# Patient Record
Sex: MALE | Race: WHITE | Employment: FULL TIME | URBAN - NONMETROPOLITAN AREA
[De-identification: names, ages, dates, MRNs, and addresses within clinical notes are randomized per-mention and may not be internally consistent; named-entity substitution may affect disease eponyms.]

---

## 2022-02-22 ENCOUNTER — HOSPITAL ENCOUNTER (EMERGENCY)
Age: 62
Discharge: HOME | End: 2022-02-22
Attending: EMERGENCY MEDICINE
Payer: MEDICAID

## 2022-02-22 VITALS
SYSTOLIC BLOOD PRESSURE: 122 MMHG | TEMPERATURE: 98.2 F | DIASTOLIC BLOOD PRESSURE: 79 MMHG | OXYGEN SATURATION: 94 % | RESPIRATION RATE: 17 BRPM | WEIGHT: 130 LBS | HEART RATE: 92 BPM

## 2022-02-22 DIAGNOSIS — R73.9 HYPERGLYCEMIA: ICD-10-CM

## 2022-02-22 DIAGNOSIS — T14.8XXA OPEN WOUND: ICD-10-CM

## 2022-02-22 DIAGNOSIS — G62.9 NEUROPATHY: Primary | ICD-10-CM

## 2022-02-22 LAB
ALBUMIN SERPL-MCNC: 3.6 G/DL (ref 3.5–5.2)
ALP BLD-CCNC: 204 U/L (ref 40–130)
ALT SERPL-CCNC: 38 U/L (ref 5–41)
AMPHETAMINE SCREEN, URINE: POSITIVE
ANION GAP SERPL CALCULATED.3IONS-SCNC: 15 MMOL/L (ref 7–19)
APTT: 29.9 SEC (ref 26–36.2)
AST SERPL-CCNC: 29 U/L (ref 5–40)
BARBITURATE SCREEN URINE: NEGATIVE
BASE EXCESS ARTERIAL: 1 MMOL/L (ref -2–2)
BASOPHILS ABSOLUTE: 0 K/UL (ref 0–0.2)
BASOPHILS RELATIVE PERCENT: 0.2 % (ref 0–1)
BENZODIAZEPINE SCREEN, URINE: NEGATIVE
BILIRUB SERPL-MCNC: 0.6 MG/DL (ref 0.2–1.2)
BUN BLDV-MCNC: 31 MG/DL (ref 8–23)
C-REACTIVE PROTEIN: 2.43 MG/DL (ref 0–0.5)
CALCIUM SERPL-MCNC: 8.9 MG/DL (ref 8.8–10.2)
CANNABINOID SCREEN URINE: POSITIVE
CARBOXYHEMOGLOBIN ARTERIAL: 4.2 % (ref 0–5)
CHLORIDE BLD-SCNC: 81 MMOL/L (ref 98–111)
CO2: 24 MMOL/L (ref 22–29)
COCAINE METABOLITE SCREEN URINE: NEGATIVE
CREAT SERPL-MCNC: 1.4 MG/DL (ref 0.5–1.2)
EOSINOPHILS ABSOLUTE: 0.1 K/UL (ref 0–0.6)
EOSINOPHILS RELATIVE PERCENT: 0.6 % (ref 0–5)
ETHANOL: <10 MG/DL (ref 0–0.08)
GFR AFRICAN AMERICAN: >59
GFR NON-AFRICAN AMERICAN: 51
GLUCOSE BLD-MCNC: 417 MG/DL (ref 70–99)
GLUCOSE BLD-MCNC: 431 MG/DL (ref 74–109)
GLUCOSE BLD-MCNC: 823 MG/DL (ref 74–109)
HCO3 ARTERIAL: 26.6 MMOL/L (ref 22–26)
HCT VFR BLD CALC: 43.2 % (ref 42–52)
HEMOGLOBIN, ART, EXTENDED: 13.1 G/DL (ref 14–18)
HEMOGLOBIN: 14.1 G/DL (ref 14–18)
IMMATURE GRANULOCYTES #: 0.1 K/UL
INR BLD: 0.97 (ref 0.88–1.18)
LACTIC ACID: 3 MMOL/L (ref 0.5–1.9)
LYMPHOCYTES ABSOLUTE: 2.2 K/UL (ref 1.1–4.5)
LYMPHOCYTES RELATIVE PERCENT: 22.7 % (ref 20–40)
Lab: ABNORMAL
MCH RBC QN AUTO: 27.4 PG (ref 27–31)
MCHC RBC AUTO-ENTMCNC: 32.6 G/DL (ref 33–37)
MCV RBC AUTO: 84 FL (ref 80–94)
METHEMOGLOBIN ARTERIAL: 1 %
MONOCYTES ABSOLUTE: 0.6 K/UL (ref 0–0.9)
MONOCYTES RELATIVE PERCENT: 5.9 % (ref 0–10)
NEUTROPHILS ABSOLUTE: 6.8 K/UL (ref 1.5–7.5)
NEUTROPHILS RELATIVE PERCENT: 70.1 % (ref 50–65)
O2 CONTENT ARTERIAL: 16.7 ML/DL
O2 SAT, ARTERIAL: 90.4 %
O2 THERAPY: ABNORMAL
OPIATE SCREEN URINE: POSITIVE
PCO2 ARTERIAL: 45 MMHG (ref 35–45)
PDW BLD-RTO: 13.3 % (ref 11.5–14.5)
PERFORMED ON: ABNORMAL
PH ARTERIAL: 7.38 (ref 7.35–7.45)
PLATELET # BLD: 286 K/UL (ref 130–400)
PMV BLD AUTO: 10.2 FL (ref 9.4–12.4)
PO2 ARTERIAL: 73 MMHG (ref 80–100)
POTASSIUM REFLEX MAGNESIUM: 4.6 MMOL/L (ref 3.5–5)
POTASSIUM, WHOLE BLOOD: 4.1
PROTHROMBIN TIME: 13.1 SEC (ref 12–14.6)
RBC # BLD: 5.14 M/UL (ref 4.7–6.1)
SEDIMENTATION RATE, ERYTHROCYTE: 55 MM/HR (ref 0–15)
SODIUM BLD-SCNC: 120 MMOL/L (ref 136–145)
TOTAL CK: 50 U/L (ref 39–308)
TOTAL PROTEIN: 8.3 G/DL (ref 6.6–8.7)
WBC # BLD: 9.7 K/UL (ref 4.8–10.8)

## 2022-02-22 PROCEDURE — 36600 WITHDRAWAL OF ARTERIAL BLOOD: CPT

## 2022-02-22 PROCEDURE — 82550 ASSAY OF CK (CPK): CPT

## 2022-02-22 PROCEDURE — 96374 THER/PROPH/DIAG INJ IV PUSH: CPT

## 2022-02-22 PROCEDURE — 80053 COMPREHEN METABOLIC PANEL: CPT

## 2022-02-22 PROCEDURE — 86140 C-REACTIVE PROTEIN: CPT

## 2022-02-22 PROCEDURE — 87040 BLOOD CULTURE FOR BACTERIA: CPT

## 2022-02-22 PROCEDURE — 36415 COLL VENOUS BLD VENIPUNCTURE: CPT

## 2022-02-22 PROCEDURE — 87150 DNA/RNA AMPLIFIED PROBE: CPT

## 2022-02-22 PROCEDURE — 99284 EMERGENCY DEPT VISIT MOD MDM: CPT

## 2022-02-22 PROCEDURE — 6360000002 HC RX W HCPCS: Performed by: EMERGENCY MEDICINE

## 2022-02-22 PROCEDURE — 83605 ASSAY OF LACTIC ACID: CPT

## 2022-02-22 PROCEDURE — 82947 ASSAY GLUCOSE BLOOD QUANT: CPT

## 2022-02-22 PROCEDURE — 82077 ASSAY SPEC XCP UR&BREATH IA: CPT

## 2022-02-22 PROCEDURE — 87077 CULTURE AEROBIC IDENTIFY: CPT

## 2022-02-22 PROCEDURE — 6370000000 HC RX 637 (ALT 250 FOR IP): Performed by: EMERGENCY MEDICINE

## 2022-02-22 PROCEDURE — 93923 UPR/LXTR ART STDY 3+ LVLS: CPT

## 2022-02-22 PROCEDURE — 84132 ASSAY OF SERUM POTASSIUM: CPT

## 2022-02-22 PROCEDURE — 85652 RBC SED RATE AUTOMATED: CPT

## 2022-02-22 PROCEDURE — 2580000003 HC RX 258: Performed by: EMERGENCY MEDICINE

## 2022-02-22 PROCEDURE — 85730 THROMBOPLASTIN TIME PARTIAL: CPT

## 2022-02-22 PROCEDURE — 80307 DRUG TEST PRSMV CHEM ANLYZR: CPT

## 2022-02-22 PROCEDURE — 85610 PROTHROMBIN TIME: CPT

## 2022-02-22 PROCEDURE — 82803 BLOOD GASES ANY COMBINATION: CPT

## 2022-02-22 PROCEDURE — 85025 COMPLETE CBC W/AUTO DIFF WBC: CPT

## 2022-02-22 PROCEDURE — 96375 TX/PRO/DX INJ NEW DRUG ADDON: CPT

## 2022-02-22 RX ORDER — 0.9 % SODIUM CHLORIDE 0.9 %
1000 INTRAVENOUS SOLUTION INTRAVENOUS ONCE
Status: COMPLETED | OUTPATIENT
Start: 2022-02-22 | End: 2022-02-22

## 2022-02-22 RX ORDER — ONDANSETRON 2 MG/ML
4 INJECTION INTRAMUSCULAR; INTRAVENOUS ONCE
Status: COMPLETED | OUTPATIENT
Start: 2022-02-22 | End: 2022-02-22

## 2022-02-22 RX ORDER — GABAPENTIN 600 MG/1
300 TABLET ORAL ONCE
Status: COMPLETED | OUTPATIENT
Start: 2022-02-22 | End: 2022-02-22

## 2022-02-22 RX ORDER — GABAPENTIN 300 MG/1
300 CAPSULE ORAL 3 TIMES DAILY
Qty: 30 CAPSULE | Refills: 0 | Status: SHIPPED | OUTPATIENT
Start: 2022-02-22 | End: 2022-07-14

## 2022-02-22 RX ORDER — MORPHINE SULFATE 4 MG/ML
4 INJECTION, SOLUTION INTRAMUSCULAR; INTRAVENOUS ONCE
Status: COMPLETED | OUTPATIENT
Start: 2022-02-22 | End: 2022-02-22

## 2022-02-22 RX ADMIN — INSULIN HUMAN 10 UNITS: 100 INJECTION, SOLUTION PARENTERAL at 19:55

## 2022-02-22 RX ADMIN — GABAPENTIN 300 MG: 600 TABLET, FILM COATED ORAL at 22:02

## 2022-02-22 RX ADMIN — SODIUM CHLORIDE 1000 ML: 9 INJECTION, SOLUTION INTRAVENOUS at 19:54

## 2022-02-22 RX ADMIN — SODIUM CHLORIDE 1000 ML: 9 INJECTION, SOLUTION INTRAVENOUS at 19:57

## 2022-02-22 RX ADMIN — MORPHINE SULFATE 4 MG: 4 INJECTION INTRAVENOUS at 19:26

## 2022-02-22 RX ADMIN — ONDANSETRON 4 MG: 2 INJECTION INTRAMUSCULAR; INTRAVENOUS at 19:25

## 2022-02-22 ASSESSMENT — PAIN SCALES - GENERAL
PAINLEVEL_OUTOF10: 10
PAINLEVEL_OUTOF10: 7

## 2022-02-22 ASSESSMENT — ENCOUNTER SYMPTOMS
DIARRHEA: 0
NAUSEA: 0
SHORTNESS OF BREATH: 0
BACK PAIN: 0
SORE THROAT: 0
ABDOMINAL PAIN: 0
VOMITING: 0
COLOR CHANGE: 1
RHINORRHEA: 0

## 2022-02-23 NOTE — ED PROVIDER NOTES
[facility] EMERGENCY DEPT  eMERGENCY dEPARTMENT eNCOUnter      Pt Name: Henrry Velarde  MRN: [id number]  [place] 1960  Date of evaluation: 2/22/2022  Provider: Conor Juarez MD    [address] [place] [address]       Chief Complaint   Patient presents with    Foot Pain     burning pain bilateral feet         HISTORY OF PRESENT ILLNESS   (Location/Symptom, Timing/Onset,Context/Setting, Quality, Duration, Modifying Factors, Severity)  Note limiting factors. Henrry Velarde is a 64 y.o. male who presents to the emergency department bilateral foot pain. It starts about mid calf and radiates down. It is a burning pain and severe in nature. Severe pain with even palpation. He states his feet have turned red and blue at times. He also reports that he has diffuse sores all over. He says he has been using meth. HPI    NursingNotes were reviewed. REVIEW OF SYSTEMS    (2-9 systems for level 4, 10 or more for level 5)     Review of Systems   Constitutional: Negative for chills and fever. HENT: Negative for rhinorrhea and sore throat. Respiratory: Negative for shortness of breath. Cardiovascular: Negative for chest pain and leg swelling. Gastrointestinal: Negative for abdominal pain, diarrhea, nausea and vomiting. Genitourinary: Negative for difficulty urinating. Musculoskeletal: Negative for back pain and neck pain. Bilateral foot pain   Skin: Positive for color change and wound. Negative for rash. Neurological: Negative for weakness and headaches. Psychiatric/Behavioral: Negative for confusion. A complete review of systems was performed and is negative except as noted above in the HPI. PAST MEDICAL HISTORY   History reviewed. No pertinent past medical history. SURGICAL HISTORY     History reviewed. No pertinent surgical history. CURRENT MEDICATIONS       Previous Medications    No medications on file       ALLERGIES     Patient has no allergy information on record.     FAMILY HISTORY     History reviewed. No pertinent family history. SOCIAL HISTORY       Social History     Socioeconomic History    Marital status: Single     Spouse name: None    Number of children: None    Years of education: None    Highest education level: None   Occupational History    None   Tobacco Use    Smoking status: Current Every Day Smoker     Packs/day: 1.00     Years: 50.00     Pack years: 50.00     Types: Cigarettes    Smokeless tobacco: Never Used   Substance and Sexual Activity    Alcohol use: Yes     Alcohol/week: 2.0 standard drinks     Types: 2 Cans of beer per week    Drug use: Yes     Types: Marijuana Dominick Meckel), Methamphetamines (Crystal Meth)    Sexual activity: Not Currently   Other Topics Concern    None   Social History Narrative    None     Social Determinants of Health     Financial Resource Strain:     Difficulty of Paying Living Expenses: Not on file   Food Insecurity:     Worried About Running Out of Food in the Last Year: Not on file    Rod of Food in the Last Year: Not on file   Transportation Needs:     Lack of Transportation (Medical): Not on file    Lack of Transportation (Non-Medical):  Not on file   Physical Activity:     Days of Exercise per Week: Not on file    Minutes of Exercise per Session: Not on file   Stress:     Feeling of Stress : Not on file   Social Connections:     Frequency of Communication with Friends and Family: Not on file    Frequency of Social Gatherings with Friends and Family: Not on file    Attends Hindu Services: Not on file    Active Member of Clubs or Organizations: Not on file    Attends Club or Organization Meetings: Not on file    Marital Status: Not on file   Intimate Partner Violence:     Fear of Current or Ex-Partner: Not on file    Emotionally Abused: Not on file    Physically Abused: Not on file    Sexually Abused: Not on file   Housing Stability:     Unable to Pay for Housing in the Last Year: Not on file    Number of Places Lived in the Last Year: Not on file    Unstable Housing in the Last Year: Not on file       SCREENINGS             PHYSICAL EXAM    (up to 7 for level 4, 8 or more for level 5)     ED Triage Vitals [02/22/22 1859]   BP Temp Temp Source Pulse Resp SpO2 Height Weight   123/82 98.2 °F (36.8 °C) Oral 95 14 93 % -- 130 lb (59 kg)       Physical Exam  Vitals and nursing note reviewed. Constitutional:       General: He is not in acute distress. Appearance: He is well-developed. He is not diaphoretic. HENT:      Head: Normocephalic and atraumatic. Eyes:      Pupils: Pupils are equal, round, and reactive to light. Cardiovascular:      Rate and Rhythm: Normal rate and regular rhythm. Heart sounds: Normal heart sounds. Pulmonary:      Effort: Pulmonary effort is normal. No respiratory distress. Breath sounds: Normal breath sounds. Abdominal:      General: Bowel sounds are normal. There is no distension. Palpations: Abdomen is soft. Tenderness: There is no abdominal tenderness. Musculoskeletal:         General: Tenderness present. No swelling. Normal range of motion. Cervical back: Normal range of motion and neck supple. Comments: Delayed capillary refill bilateral lower extremities with faint pulses. Skin:     General: Skin is warm and dry. Findings: No rash. Comments: He has small circular sores diffusely on his legs abdomen hands   Neurological:      Mental Status: He is alert and oriented to person, place, and time. Cranial Nerves: No cranial nerve deficit. Motor: No abnormal muscle tone. Coordination: Coordination normal.   Psychiatric:         Mood and Affect: Affect is angry. Behavior: Behavior is uncooperative and agitated.          DIAGNOSTIC RESULTS     EKG: All EKG's are interpreted by the Emergency Department Physician who either signs or Co-signs this chart in the absence of a cardiologist.         RADIOLOGY: Non-plain film images such as CT, Ultrasound and MRI are read by the radiologist. [place] images are visualized and preliminarily interpreted by the emergency physician with the below findings:        Interpretation per the Radiologist below, if available at the time of this note:    VL Lower Extremity Arterial Segmental Pressures W Ppg               ED BEDSIDE ULTRASOUND:   Performed by ED Physician - none    LABS:  Labs Reviewed   CBC WITH AUTO DIFFERENTIAL - Abnormal; Notable for the following components:       Result Value    MCHC 32.6 (*)     Neutrophils % 70.1 (*)     All other components within normal limits   COMPREHENSIVE METABOLIC PANEL W/ REFLEX TO MG FOR LOW K - Abnormal; Notable for the following components:    Sodium 120 (*)     Chloride 81 (*)     Glucose 823 (*)     BUN 31 (*)     CREATININE 1.4 (*)     GFR Non-African American 51 (*)     Alkaline Phosphatase 204 (*)     All other components within normal limits    Narrative:     CALL  [facility]  K[facility] tel. ,  Chemistry results called to and read back by [facility] RN/ER, 02/22/2022 19:47,  by Jada Jacob 136 - Abnormal; Notable for the following components:    Sed Rate 55 (*)     All other components within normal limits   C-REACTIVE PROTEIN - Abnormal; Notable for the following components:    CRP 2.43 (*)     All other components within normal limits   URINE DRUG SCREEN - Abnormal; Notable for the following components:    Amphetamine Screen, Urine Positive (*)     Cannabinoid Scrn, Ur Positive (*)     Opiate Scrn, Ur Positive (*)     All other components within normal limits   LACTIC ACID - Abnormal; Notable for the following components:    Lactic Acid 3.0 (*)     All other components within normal limits    Narrative:     CALL  [facility]  KLED tel. ,  Chemistry results called to and read back by [facility] RN/ER, 02/22/2022 19:36,  by [address] [place] [address], ARTERIAL - Abnormal; Notable for the following components:    pO2, Arterial 73.0 (*) HCO3, Arterial 26.6 (*)     Hemoglobin, Art, Extended 13.1 (*)     All other components within normal limits   GLUCOSE, RANDOM - Abnormal; Notable for the following components:    Glucose 431 (*)     All other components within normal limits   CULTURE, BLOOD 1   CULTURE, BLOOD 2   PROTIME-INR   APTT   ETHANOL   CK   POTASSIUM, WHOLE BLOOD       All other labs were within normal range or not returned as of this dictation. EMERGENCY DEPARTMENT COURSE and DIFFERENTIALDIAGNOSIS/MDM:   Vitals:    Vitals:    02/22/22 1859 02/22/22 2043   BP: 123/82 127/81   Pulse: 95 91   Resp: 14    Temp: 98.2 °F (36.8 °C)    TempSrc: Oral    SpO2: 93%    Weight: 130 lb (59 kg)        MDM  Corrected sodium is 132. Sugar improved with IV fluids and insulin. Patient is homeless and noncompliant says he is not going to fill any medications that we write. His ABIs were good. I think his symptoms are most consistent with neuropathy from his wildly uncontrolled diabetes. He has wounds diffusely over his body. Possibly from picking from meth or may be some type of insect bite. Does not seem consistent with scabies or shingles or abscesses or infection. They are scabbed over with no drainage. Vascular lab preliminary results. Bilateral lower extremity arterial segmental exam performed.      Right:  PT 1.23  DP 1.23  Digit Non-compressible     Left:  PT 1.20  DP 1.18  Digit Non-compressible     Final report pending. CONSULTS:  None    PROCEDURES:  Unless otherwise notedbelow, none     Procedures    FINAL IMPRESSION     1. Neuropathy    2. Open wound    3. Hyperglycemia          DISPOSITION/PLAN   DISPOSITION Decision To Discharge 02/22/2022 10:06:51 PM      PATIENT REFERRED TO:  @FUP@    DISCHARGE MEDICATIONS:  New Prescriptions    GABAPENTIN (NEURONTIN) 300 MG CAPSULE    Take 1 capsule by mouth 3 times daily for 10 days.     METFORMIN (GLUCOPHAGE) 500 MG TABLET    Take 1 tablet by mouth 2 times daily (with meals) (Please note that portions of this note were completed with a voice recognition program.  Efforts were made to edit the dictations butoccasionally words are mis-transcribed.)    Consuelo He MD (electronically signed)  AttendingEmergency Physician         Consuelo He MD  02/22/22 1179       Consuelo He MD  02/22/22 0767

## 2022-02-23 NOTE — PROGRESS NOTES
BLOOD GAS, ARTERIAL     Status: Final result    Component Ref Range & Units 02/22/22 2054   pH, Arterial 7.350 - 7.450 7.380    pCO2, Arterial 35.0 - 45.0 mmHg 45.0    pO2, Arterial 80.0 - 100.0 mmHg 73.0 Low     HCO3, Arterial 22.0 - 26.0 mmol/L 26.6 High     Base Excess, Arterial -2.0 - 2.0 mmol/L 1.0    Hemoglobin, Art, Extended 14.0 - 18.0 g/dL 13.1 Low     O2 Sat, Arterial >92 % 90.4    Carboxyhgb, Arterial 0.0 - 5.0 % 4.2    Comment:      0.0-1.5   (Smokers 1.5-5.0)    Methemoglobin, Arterial <1.5 % 1.0    O2 Content, Arterial Not Established mL/dL 16.7    O2 Therapy  Unknown    Resulting Agency  [facility]        Specimen Collected: 02/22/22 2054 Last Resulted: 02/22/22 2055 View Other Order Details        Room air  RB site choice

## 2022-02-27 LAB — CULTURE, BLOOD 2: NORMAL

## 2022-02-28 LAB
BLOOD CULTURE, ROUTINE: ABNORMAL
BLOOD CULTURE, ROUTINE: ABNORMAL
ORGANISM: ABNORMAL

## 2022-03-16 LAB
ACINETOBACTER CALCOAC BAUMANNII COMPLEX BY PCR: NOT DETECTED
BACTEROIDES FRAGILIS BY PCR: NOT DETECTED
CANDIDA ALBICANS BY PCR: NOT DETECTED
CANDIDA AURIS BY PCR: NOT DETECTED
CANDIDA GLABRATA BY PCR: NOT DETECTED
CANDIDA KRUSEI BY PCR: NOT DETECTED
CANDIDA PARAPSILOSIS BY PCR: NOT DETECTED
CANDIDA TROPICALIS BY PCR: NOT DETECTED
CRYPTOCOCCUS NEOFORMANS/GATTII BY PCR: NOT DETECTED
ENTEROBACTER CLOACAE COMPLEX BY PCR: NOT DETECTED
ENTEROBACTERALES BY PCR: NOT DETECTED
ENTEROCOCCUS FAECALIS BY PCR: NOT DETECTED
ENTEROCOCCUS FAECIUM BY PCR: NOT DETECTED
ESCHERICHIA COLI BY PCR: NOT DETECTED
HAEMOPHILUS INFLUENZAE BY PCR: NOT DETECTED
KLEBSIELLA AEROGENES BY PCR: NOT DETECTED
KLEBSIELLA OXYTOCA BY PCR: NOT DETECTED
KLEBSIELLA PNEUMONIAE GROUP BY PCR: NOT DETECTED
LISTERIA MONOCYTOGENES BY PCR: NOT DETECTED
NEISSERIA MENINGITIDIS BY PCR: NOT DETECTED
PROTEUS SPECIES BY PCR: NOT DETECTED
PSEUDOMONAS AERUGINOSA BY PCR: NOT DETECTED
SALMONELLA SPECIES BY PCR: NOT DETECTED
SERRATIA MARCESCENS BY PCR: NOT DETECTED
STAPHYLOCOCCUS AUREUS BY PCR: NOT DETECTED
STAPHYLOCOCCUS EPIDERMIDIS BY PCR: NOT DETECTED
STAPHYLOCOCCUS LUGDUNENSIS BY PCR: NOT DETECTED
STAPHYLOCOCCUS SPECIES BY PCR: NOT DETECTED
STENOTROPHOMONAS MALTOPHILIA BY PCR: NOT DETECTED
STREPTOCOCCUS AGALACTIAE BY PCR: NOT DETECTED
STREPTOCOCCUS PNEUMONIAE BY PCR: NOT DETECTED
STREPTOCOCCUS PYOGENES  BY PCR: NOT DETECTED
STREPTOCOCCUS SPECIES BY PCR: NOT DETECTED

## 2022-07-14 ENCOUNTER — APPOINTMENT (OUTPATIENT)
Dept: GENERAL RADIOLOGY | Age: 62
DRG: 351 | End: 2022-07-14
Payer: MEDICAID

## 2022-07-14 ENCOUNTER — HOSPITAL ENCOUNTER (INPATIENT)
Age: 62
LOS: 1 days | Discharge: LEFT BEFORE DISCHARGE | DRG: 351 | End: 2022-07-15
Attending: EMERGENCY MEDICINE | Admitting: HOSPITALIST
Payer: MEDICAID

## 2022-07-14 DIAGNOSIS — L03.011 CELLULITIS OF FINGER OF RIGHT HAND: Primary | ICD-10-CM

## 2022-07-14 PROBLEM — M65.9 TENOSYNOVITIS OF HAND: Status: ACTIVE | Noted: 2022-07-14

## 2022-07-14 LAB
ALBUMIN SERPL-MCNC: 4 G/DL (ref 3.5–5.2)
ALP BLD-CCNC: 101 U/L (ref 40–130)
ALT SERPL-CCNC: 19 U/L (ref 5–41)
ANION GAP SERPL CALCULATED.3IONS-SCNC: 9 MMOL/L (ref 7–19)
AST SERPL-CCNC: 21 U/L (ref 5–40)
BASOPHILS ABSOLUTE: 0 K/UL (ref 0–0.2)
BASOPHILS RELATIVE PERCENT: 0.3 % (ref 0–1)
BILIRUB SERPL-MCNC: <0.2 MG/DL (ref 0.2–1.2)
BUN BLDV-MCNC: 18 MG/DL (ref 8–23)
C-REACTIVE PROTEIN: 0.47 MG/DL (ref 0–0.5)
CALCIUM SERPL-MCNC: 9.2 MG/DL (ref 8.8–10.2)
CHLORIDE BLD-SCNC: 108 MMOL/L (ref 98–111)
CO2: 24 MMOL/L (ref 22–29)
CREAT SERPL-MCNC: 1.1 MG/DL (ref 0.5–1.2)
EOSINOPHILS ABSOLUTE: 0.1 K/UL (ref 0–0.6)
EOSINOPHILS RELATIVE PERCENT: 1.4 % (ref 0–5)
GFR AFRICAN AMERICAN: >59
GFR NON-AFRICAN AMERICAN: >60
GLUCOSE BLD-MCNC: 143 MG/DL (ref 70–99)
GLUCOSE BLD-MCNC: 147 MG/DL (ref 74–109)
GLUCOSE BLD-MCNC: 183 MG/DL (ref 70–99)
GLUCOSE BLD-MCNC: 274 MG/DL (ref 70–99)
HBA1C MFR BLD: 10.2 % (ref 4–6)
HCT VFR BLD CALC: 38.5 % (ref 42–52)
HEMOGLOBIN: 12.3 G/DL (ref 14–18)
IMMATURE GRANULOCYTES #: 0 K/UL
LACTIC ACID: 1.4 MMOL/L (ref 0.5–1.9)
LYMPHOCYTES ABSOLUTE: 1.9 K/UL (ref 1.1–4.5)
LYMPHOCYTES RELATIVE PERCENT: 28.7 % (ref 20–40)
MCH RBC QN AUTO: 28.3 PG (ref 27–31)
MCHC RBC AUTO-ENTMCNC: 31.9 G/DL (ref 33–37)
MCV RBC AUTO: 88.7 FL (ref 80–94)
MONOCYTES ABSOLUTE: 0.4 K/UL (ref 0–0.9)
MONOCYTES RELATIVE PERCENT: 5.9 % (ref 0–10)
NEUTROPHILS ABSOLUTE: 4.1 K/UL (ref 1.5–7.5)
NEUTROPHILS RELATIVE PERCENT: 63.5 % (ref 50–65)
PDW BLD-RTO: 13.2 % (ref 11.5–14.5)
PERFORMED ON: ABNORMAL
PLATELET # BLD: 143 K/UL (ref 130–400)
PMV BLD AUTO: 9.6 FL (ref 9.4–12.4)
POTASSIUM SERPL-SCNC: 4.4 MMOL/L (ref 3.5–5)
RBC # BLD: 4.34 M/UL (ref 4.7–6.1)
SARS-COV-2, NAAT: NOT DETECTED
SEDIMENTATION RATE, ERYTHROCYTE: 17 MM/HR (ref 0–15)
SODIUM BLD-SCNC: 141 MMOL/L (ref 136–145)
STREP PNEUMONIAE ANTIGEN, URINE: NORMAL
TOTAL PROTEIN: 7.4 G/DL (ref 6.6–8.7)
WBC # BLD: 6.5 K/UL (ref 4.8–10.8)

## 2022-07-14 PROCEDURE — 1210000000 HC MED SURG R&B

## 2022-07-14 PROCEDURE — 6360000002 HC RX W HCPCS: Performed by: HOSPITALIST

## 2022-07-14 PROCEDURE — 2580000003 HC RX 258: Performed by: HOSPITALIST

## 2022-07-14 PROCEDURE — 80053 COMPREHEN METABOLIC PANEL: CPT

## 2022-07-14 PROCEDURE — 86140 C-REACTIVE PROTEIN: CPT

## 2022-07-14 PROCEDURE — G0378 HOSPITAL OBSERVATION PER HR: HCPCS

## 2022-07-14 PROCEDURE — 6360000002 HC RX W HCPCS: Performed by: EMERGENCY MEDICINE

## 2022-07-14 PROCEDURE — 96375 TX/PRO/DX INJ NEW DRUG ADDON: CPT

## 2022-07-14 PROCEDURE — 83036 HEMOGLOBIN GLYCOSYLATED A1C: CPT

## 2022-07-14 PROCEDURE — 36415 COLL VENOUS BLD VENIPUNCTURE: CPT

## 2022-07-14 PROCEDURE — 85652 RBC SED RATE AUTOMATED: CPT

## 2022-07-14 PROCEDURE — 6370000000 HC RX 637 (ALT 250 FOR IP): Performed by: HOSPITALIST

## 2022-07-14 PROCEDURE — 96365 THER/PROPH/DIAG IV INF INIT: CPT

## 2022-07-14 PROCEDURE — 85025 COMPLETE CBC W/AUTO DIFF WBC: CPT

## 2022-07-14 PROCEDURE — 96372 THER/PROPH/DIAG INJ SC/IM: CPT

## 2022-07-14 PROCEDURE — 99285 EMERGENCY DEPT VISIT HI MDM: CPT

## 2022-07-14 PROCEDURE — 96367 TX/PROPH/DG ADDL SEQ IV INF: CPT

## 2022-07-14 PROCEDURE — 87449 NOS EACH ORGANISM AG IA: CPT

## 2022-07-14 PROCEDURE — 73140 X-RAY EXAM OF FINGER(S): CPT

## 2022-07-14 PROCEDURE — 87635 SARS-COV-2 COVID-19 AMP PRB: CPT

## 2022-07-14 PROCEDURE — 73140 X-RAY EXAM OF FINGER(S): CPT | Performed by: RADIOLOGY

## 2022-07-14 PROCEDURE — 82947 ASSAY GLUCOSE BLOOD QUANT: CPT

## 2022-07-14 PROCEDURE — 87040 BLOOD CULTURE FOR BACTERIA: CPT

## 2022-07-14 PROCEDURE — 83605 ASSAY OF LACTIC ACID: CPT

## 2022-07-14 RX ORDER — INSULIN GLARGINE 100 [IU]/ML
10 INJECTION, SOLUTION SUBCUTANEOUS NIGHTLY
Status: DISCONTINUED | OUTPATIENT
Start: 2022-07-14 | End: 2022-07-14

## 2022-07-14 RX ORDER — MORPHINE SULFATE 2 MG/ML
2 INJECTION, SOLUTION INTRAMUSCULAR; INTRAVENOUS
Status: DISCONTINUED | OUTPATIENT
Start: 2022-07-14 | End: 2022-07-15 | Stop reason: HOSPADM

## 2022-07-14 RX ORDER — SODIUM CHLORIDE 0.9 % (FLUSH) 0.9 %
5-40 SYRINGE (ML) INJECTION PRN
Status: DISCONTINUED | OUTPATIENT
Start: 2022-07-14 | End: 2022-07-15 | Stop reason: HOSPADM

## 2022-07-14 RX ORDER — ALBUTEROL SULFATE 2.5 MG/3ML
2.5 SOLUTION RESPIRATORY (INHALATION)
Status: DISCONTINUED | OUTPATIENT
Start: 2022-07-14 | End: 2022-07-15 | Stop reason: HOSPADM

## 2022-07-14 RX ORDER — SODIUM CHLORIDE 0.9 % (FLUSH) 0.9 %
5-40 SYRINGE (ML) INJECTION EVERY 12 HOURS SCHEDULED
Status: DISCONTINUED | OUTPATIENT
Start: 2022-07-14 | End: 2022-07-15 | Stop reason: HOSPADM

## 2022-07-14 RX ORDER — ONDANSETRON 2 MG/ML
4 INJECTION INTRAMUSCULAR; INTRAVENOUS EVERY 6 HOURS PRN
Status: DISCONTINUED | OUTPATIENT
Start: 2022-07-14 | End: 2022-07-15 | Stop reason: HOSPADM

## 2022-07-14 RX ORDER — ACETAMINOPHEN 325 MG/1
650 TABLET ORAL EVERY 4 HOURS PRN
Status: DISCONTINUED | OUTPATIENT
Start: 2022-07-14 | End: 2022-07-15 | Stop reason: HOSPADM

## 2022-07-14 RX ORDER — SODIUM CHLORIDE 9 MG/ML
INJECTION, SOLUTION INTRAVENOUS PRN
Status: DISCONTINUED | OUTPATIENT
Start: 2022-07-14 | End: 2022-07-15 | Stop reason: HOSPADM

## 2022-07-14 RX ORDER — INSULIN LISPRO 100 [IU]/ML
0-6 INJECTION, SOLUTION INTRAVENOUS; SUBCUTANEOUS
Status: DISCONTINUED | OUTPATIENT
Start: 2022-07-14 | End: 2022-07-15 | Stop reason: HOSPADM

## 2022-07-14 RX ORDER — MECOBALAMIN 5000 MCG
5 TABLET,DISINTEGRATING ORAL NIGHTLY PRN
Status: DISCONTINUED | OUTPATIENT
Start: 2022-07-14 | End: 2022-07-15 | Stop reason: HOSPADM

## 2022-07-14 RX ORDER — NALOXONE HYDROCHLORIDE 0.4 MG/ML
0.4 INJECTION, SOLUTION INTRAMUSCULAR; INTRAVENOUS; SUBCUTANEOUS PRN
Status: DISCONTINUED | OUTPATIENT
Start: 2022-07-14 | End: 2022-07-15 | Stop reason: HOSPADM

## 2022-07-14 RX ORDER — INSULIN GLARGINE 100 [IU]/ML
5 INJECTION, SOLUTION SUBCUTANEOUS NIGHTLY
Status: DISCONTINUED | OUTPATIENT
Start: 2022-07-14 | End: 2022-07-15

## 2022-07-14 RX ORDER — HEPARIN SODIUM 5000 [USP'U]/ML
5000 INJECTION, SOLUTION INTRAVENOUS; SUBCUTANEOUS EVERY 8 HOURS SCHEDULED
Status: DISCONTINUED | OUTPATIENT
Start: 2022-07-14 | End: 2022-07-15 | Stop reason: HOSPADM

## 2022-07-14 RX ORDER — OXYCODONE HYDROCHLORIDE 5 MG/1
5 TABLET ORAL EVERY 4 HOURS PRN
Status: DISCONTINUED | OUTPATIENT
Start: 2022-07-14 | End: 2022-07-15 | Stop reason: HOSPADM

## 2022-07-14 RX ORDER — POLYETHYLENE GLYCOL 3350 17 G/17G
17 POWDER, FOR SOLUTION ORAL DAILY PRN
Status: DISCONTINUED | OUTPATIENT
Start: 2022-07-14 | End: 2022-07-15 | Stop reason: HOSPADM

## 2022-07-14 RX ORDER — MORPHINE SULFATE 4 MG/ML
4 INJECTION, SOLUTION INTRAMUSCULAR; INTRAVENOUS ONCE
Status: COMPLETED | OUTPATIENT
Start: 2022-07-14 | End: 2022-07-14

## 2022-07-14 RX ORDER — OXYCODONE HYDROCHLORIDE 5 MG/1
5 TABLET ORAL ONCE
Status: COMPLETED | OUTPATIENT
Start: 2022-07-14 | End: 2022-07-14

## 2022-07-14 RX ORDER — CEFTRIAXONE 2 G/50ML
2000 INJECTION, SOLUTION INTRAVENOUS EVERY 24 HOURS
Status: DISCONTINUED | OUTPATIENT
Start: 2022-07-14 | End: 2022-07-15

## 2022-07-14 RX ORDER — ONDANSETRON 4 MG/1
4 TABLET, ORALLY DISINTEGRATING ORAL EVERY 8 HOURS PRN
Status: DISCONTINUED | OUTPATIENT
Start: 2022-07-14 | End: 2022-07-15 | Stop reason: HOSPADM

## 2022-07-14 RX ORDER — SODIUM CHLORIDE, SODIUM LACTATE, POTASSIUM CHLORIDE, CALCIUM CHLORIDE 600; 310; 30; 20 MG/100ML; MG/100ML; MG/100ML; MG/100ML
INJECTION, SOLUTION INTRAVENOUS CONTINUOUS
Status: DISCONTINUED | OUTPATIENT
Start: 2022-07-14 | End: 2022-07-15 | Stop reason: HOSPADM

## 2022-07-14 RX ORDER — DEXTROSE MONOHYDRATE 50 MG/ML
100 INJECTION, SOLUTION INTRAVENOUS PRN
Status: DISCONTINUED | OUTPATIENT
Start: 2022-07-14 | End: 2022-07-15 | Stop reason: HOSPADM

## 2022-07-14 RX ORDER — INSULIN LISPRO 100 [IU]/ML
0-3 INJECTION, SOLUTION INTRAVENOUS; SUBCUTANEOUS NIGHTLY
Status: DISCONTINUED | OUTPATIENT
Start: 2022-07-14 | End: 2022-07-15 | Stop reason: HOSPADM

## 2022-07-14 RX ADMIN — SODIUM CHLORIDE, POTASSIUM CHLORIDE, SODIUM LACTATE AND CALCIUM CHLORIDE: 600; 310; 30; 20 INJECTION, SOLUTION INTRAVENOUS at 10:03

## 2022-07-14 RX ADMIN — SODIUM CHLORIDE, PRESERVATIVE FREE 10 ML: 5 INJECTION INTRAVENOUS at 10:03

## 2022-07-14 RX ADMIN — OXYCODONE 5 MG: 5 TABLET ORAL at 18:05

## 2022-07-14 RX ADMIN — MORPHINE SULFATE 4 MG: 4 INJECTION, SOLUTION INTRAMUSCULAR; INTRAVENOUS at 04:33

## 2022-07-14 RX ADMIN — HEPARIN SODIUM 5000 UNITS: 5000 INJECTION INTRAVENOUS; SUBCUTANEOUS at 21:46

## 2022-07-14 RX ADMIN — OXYCODONE 5 MG: 5 TABLET ORAL at 12:23

## 2022-07-14 RX ADMIN — INSULIN LISPRO 1 UNITS: 100 INJECTION, SOLUTION INTRAVENOUS; SUBCUTANEOUS at 21:46

## 2022-07-14 RX ADMIN — OXYCODONE 5 MG: 5 TABLET ORAL at 07:43

## 2022-07-14 RX ADMIN — CEFTRIAXONE 2000 MG: 2 INJECTION, SOLUTION INTRAVENOUS at 07:45

## 2022-07-14 RX ADMIN — Medication 1000 MG: at 05:07

## 2022-07-14 RX ADMIN — INSULIN LISPRO 3 UNITS: 100 INJECTION, SOLUTION INTRAVENOUS; SUBCUTANEOUS at 18:06

## 2022-07-14 RX ADMIN — INSULIN GLARGINE 5 UNITS: 100 INJECTION, SOLUTION SUBCUTANEOUS at 21:46

## 2022-07-14 ASSESSMENT — ENCOUNTER SYMPTOMS
DIARRHEA: 0
SHORTNESS OF BREATH: 1
CONSTIPATION: 0
ABDOMINAL PAIN: 0
SHORTNESS OF BREATH: 0
VOMITING: 0
NAUSEA: 0

## 2022-07-14 ASSESSMENT — PAIN DESCRIPTION - DESCRIPTORS
DESCRIPTORS: SHARP
DESCRIPTORS: ACHING
DESCRIPTORS: BURNING

## 2022-07-14 ASSESSMENT — PAIN DESCRIPTION - LOCATION
LOCATION: FINGER (COMMENT WHICH ONE)
LOCATION: FINGER (COMMENT WHICH ONE)
LOCATION: ARM
LOCATION: FINGER (COMMENT WHICH ONE)
LOCATION: FINGER (COMMENT WHICH ONE)

## 2022-07-14 ASSESSMENT — PAIN SCALES - GENERAL
PAINLEVEL_OUTOF10: 7
PAINLEVEL_OUTOF10: 10

## 2022-07-14 ASSESSMENT — PAIN - FUNCTIONAL ASSESSMENT
PAIN_FUNCTIONAL_ASSESSMENT: 0-10
PAIN_FUNCTIONAL_ASSESSMENT: 0-10

## 2022-07-14 ASSESSMENT — PAIN DESCRIPTION - ORIENTATION
ORIENTATION: RIGHT

## 2022-07-14 NOTE — PROGRESS NOTES
4 Eyes Skin Assessment    Sebas Wood is being assessed upon: Admission    I agree that I, Alexus Barton RN, along with Val Vincent. (either 2 RN's or 1 LPN and 1 RN) have performed a thorough Head to Toe Skin Assessment on the patient. ALL assessment sites listed below have been assessed. Areas assessed by both nurses:     [x]   Head, Face, and Ears   [x]   Shoulders, Back, and Chest  [x]   Arms, Elbows, and Hands   [x]   Coccyx, Sacrum, and Ischium  [x]   Legs, Feet, and Heels    Does the Patient have Skin Breakdown? Yes, wound(s) noted upon assessment. It is the responsibility of the Primary Nurse to assure that the following documentation, preventions, orders, and consults are complete on the above noted wound(s): Wound LDA initiated. LDA Flowsheet Documentation includes the Sofía-wound, Wound Assessment, Measurements, Dressing Treatment, Drainage, and Color.     [place]ago Prevention initiated: Yes  Wound Care Orders initiated: Yes    [address]  nurse consulted for Pressure Injury (Stage 3,4, Unstageable, DTI, NWPT, and Complex wounds) and New or Established Ostomies: No        Primary Nurse eSignature: Alexus Barton RN on 7/14/2022 at 9:29 AM      Co-Signer eSignature: {Esignature:[id number]}

## 2022-07-14 NOTE — ED PROVIDER NOTES
[facility] EMERGENCY DEPT  eMERGENCY dEPARTMENT eNCOUnter      Pt Name: Tanmay Quiroz  MRN: [id number]  [place] 1960  Date of evaluation: 7/14/2022  Provider: Ambrose Suarez MD    CHIEF COMPLAINT       Chief Complaint   Patient presents with   Yue Dunn 83     pt states he thinks he got bit by a spider last Saturday, right hand ring finer         HISTORY OF PRESENT ILLNESS   (Location/Symptom, Timing/Onset,Context/Setting, Quality, Duration, Modifying Factors, Severity)  Note limiting factors. Tanmay Quiroz is a 58 y.o. male who presents to the emergency department for evaluation regarding moderate severity pain and swelling involving the fourth digit on his right hand. Patient thinks that he might of gotten bit by a spider several days ago. Since this time he has noticed increased pain and swelling of the left fourth digit. Tonight the pain became more severe and he was experiencing throbbing in his arm and hand area. States that he feels the hand is quite swollen and without relieving factors. Patient is a known diabetic. He states that he has not had any fevers or chills or vomiting. No prior history of similar symptoms. HPI    NursingNotes were reviewed. REVIEW OF SYSTEMS    (2-9 systems for level 4, 10 or more for level 5)     Review of Systems   Constitutional: Negative for chills and fever. Respiratory: Negative for shortness of breath. Cardiovascular: Negative for leg swelling. Gastrointestinal: Negative for abdominal pain, diarrhea, nausea and vomiting. Musculoskeletal: Positive for joint swelling. Skin: Positive for wound. Neurological: Negative for syncope. All other systems reviewed and are negative. PAST MEDICALHISTORY   History reviewed. No pertinent past medical history. SURGICAL HISTORY     History reviewed. No pertinent surgical history.       CURRENT MEDICATIONS     Previous Medications    No medications on file       ALLERGIES     Patient has no known allergies. FAMILY HISTORY     History reviewed. No pertinent family history. SOCIAL HISTORY       Social History     Socioeconomic History    Marital status: Single     Spouse name: None    Number of children: None    Years of education: None    Highest education level: None   Occupational History    None   Tobacco Use    Smoking status: Current Every Day Smoker     Packs/day: 0.50     Years: 50.00     Pack years: 25.00     Types: Cigarettes    Smokeless tobacco: Never Used   Substance and Sexual Activity    Alcohol use: Yes     Alcohol/week: 2.0 standard drinks     Types: 2 Cans of beer per week    Drug use: Yes     Types: Marijuana Geo Coil), Methamphetamines (Crystal Meth)    Sexual activity: Not Currently   Other Topics Concern    None   Social History Narrative    None     Social Determinants of Health     Financial Resource Strain:     Difficulty of Paying Living Expenses: Not on file   Food Insecurity:     Worried About Running Out of Food in the Last Year: Not on file    Rod of Food in the Last Year: Not on file   Transportation Needs:     Lack of Transportation (Medical): Not on file    Lack of Transportation (Non-Medical):  Not on file   Physical Activity:     Days of Exercise per Week: Not on file    Minutes of Exercise per Session: Not on file   Stress:     Feeling of Stress : Not on file   Social Connections:     Frequency of Communication with Friends and Family: Not on file    Frequency of Social Gatherings with Friends and Family: Not on file    Attends Synagogue Services: Not on file    Active Member of Clubs or Organizations: Not on file    Attends Club or Organization Meetings: Not on file    Marital Status: Not on file   Intimate Partner Violence:     Fear of Current or Ex-Partner: Not on file    Emotionally Abused: Not on file    Physically Abused: Not on file    Sexually Abused: Not on file   Housing Stability:     Unable to Pay for Housing in the Last Year: Not on file    Number of Places Lived in the Last Year: Not on file    Unstable Housing in the Last Year: Not on file       SCREENINGS    [place] Coma Scale  Eye Opening: Spontaneous  Best Verbal Response: Oriented  Best Motor Response: Obeys commands  [place] Coma Scale Score: 15        PHYSICAL EXAM    (up to 7 for level 4, 8 or more for level 5)     ED Triage Vitals [07/14/22 0308]   BP Temp Temp src Heart Rate Resp SpO2 Height Weight   (!) 176/85 98.3 °F (36.8 °C) -- 82 22 96 % 5' 9\" (1.753 m) 150 lb (68 kg)       Physical Exam  Vitals and nursing note reviewed. HENT:      Head: Atraumatic. Mouth/Throat:      Mouth: Mucous membranes are not dry. Eyes:      General: No scleral icterus. Pupils: Pupils are equal, round, and reactive to light. Neck:      Trachea: No tracheal deviation. Cardiovascular:      Rate and Rhythm: Normal rate and regular rhythm. Heart sounds: Normal heart sounds. No murmur heard. Pulmonary:      Effort: Pulmonary effort is normal. No respiratory distress. Breath sounds: Normal breath sounds. No stridor. Abdominal:      General: There is no distension. Palpations: Abdomen is soft. Tenderness: There is no abdominal tenderness. There is no guarding. Musculoskeletal:        Hands:       Comments: There is erythema and swelling and tenderness is identified over the fourth digit on the right hand. There is some swelling and erythema streaking up onto the dorsal aspect of the hand. Patient has pain with any movement of the digit. There is some skin breakdown over the medial aspect. Skin:     Capillary Refill: Capillary refill takes less than 2 seconds. Coloration: Skin is not pale. Findings: No rash. Neurological:      Mental Status: He is alert and oriented to person, place, and time. Psychiatric:         Behavior: Behavior is cooperative.          DIAGNOSTIC RESULTS       RADIOLOGY:  Non-plain film images such as CT, Ultrasound and MRI are read by the radiologist. Plain radiographic images are visualized and preliminarily interpreted bythe emergency physician with the below findings:      XR FINGER RIGHT (MIN 2 VIEWS)   Final Result   No acute osseous findings. Fusiform soft tissue swelling of the fourth finger   Recommendation:   Follow up as clinically indicated. Note: Direct correlation with point tenderness and the X-ray images is recommended and does significantly increase the sensitivity of radiographic evaluation. Electronically Signed by Isabela Lagos DO at 14-Jul-2022 05:37:35 AM                       LABS:  Labs Reviewed   SEDIMENTATION RATE - Abnormal; Notable for the following components:       Result Value    Sed Rate 17 (*)     All other components within normal limits   COMPREHENSIVE METABOLIC PANEL - Abnormal; Notable for the following components:    Glucose 147 (*)     All other components within normal limits   CBC WITH AUTO DIFFERENTIAL - Abnormal; Notable for the following components:    RBC 4.34 (*)     Hemoglobin 12.3 (*)     Hematocrit 38.5 (*)     MCHC 31.9 (*)     All other components within normal limits   CULTURE, BLOOD 1   CULTURE, BLOOD 2   COVID-19, RAPID   STREP PNEUMONIAE ANTIGEN   C-REACTIVE PROTEIN   LACTIC ACID   HEMOGLOBIN A1C   POCT GLUCOSE   POCT GLUCOSE   POCT GLUCOSE       All other labs were within normal range or not returned as of this dictation. EMERGENCY DEPARTMENT COURSE and DIFFERENTIAL DIAGNOSIS/MDM:   Vitals:    Vitals:    07/14/22 0308   BP: (!) 176/85   Pulse: 82   Resp: 22   Temp: 98.3 °F (36.8 °C)   SpO2: 96%   Weight: 150 lb (68 kg)   Height: 5' 9\" (1.753 m)       MDM    Reassessment    Patient's WBC count looks okay. His ESR is slightly elevated. Radiograph does not demonstrate any gas in the soft tissue. There is fusiform swelling identified. Orthopedic consultation and admission to the medicine service for broad-spectrum IV antibiotics.       CONSULTS:    Case

## 2022-07-14 NOTE — H&P
[facility] History and Physical    Patient Information:  Patient: Tanmay Quiroz  MRN: [id number]   Acct: [de-identified]  YOB: 1960  Admit Date: 7/14/2022      Date of Service: 7/14/2022  Primary Care Physician: None Provider  Advance Directive: Full Code  Health Care Proxy: he states that he has no one         SUBJECTIVE:    Chief Complaint   Patient presents with   [address]     pt states he thinks he got bit by a spider last Saturday, right hand ring finer     EP Sign Out:  infected spider bite of right hand with cellulitis and possible involvement of deeper structures    HPI:  Mr. Tanmay Quiroz is a pleasant 58year old  [place] gentleman who had a spider bite 4-5 nights ago when helping to clean the garage late at night. He states that he came in this AM as he could not take the pain anymore. He has been using the triple antibiotic ointment without help. He states that he had the pain burning and throbbing in the hand and that it radiated up to the elbow. He states that this is the most painful thing he has ever had in his life. He states that he has had a yellowish tinted clearish drainage without any evident cream in it. He states that he can feel fluid in it. He states that the forearm was tightly swollen and very tender earlier but it has shrunk back toward the finger in the right hand where it began. Review of Systems:   Review of Systems   Constitutional: Positive for chills, diaphoresis and fatigue. Negative for fever. Respiratory: Positive for shortness of breath (at his baseline). Cardiovascular: Negative for chest pain. Gastrointestinal: Negative for constipation and diarrhea. Genitourinary: Negative for dysuria. Musculoskeletal:        Pain with using the right hand   Neurological: Positive for weakness and light-headedness. Negative for syncope. Psychiatric/Behavioral: Positive for confusion and dysphoric mood.      Past Medical History:   Diagnosis Date    [facility] disease[facility]     Diabetes mellitus type 2, uncontrolled, with complications ([facility]     Diabetic neuropathy associated with type 2 diabetes mellitus ([facility]      Past Psychiatric History:  States he has a history of this and that it is in our computer/records    Past Surgical History:   Procedure Laterality Date    EXTERNAL EAR SURGERY Right 1978    exploratory right external ear surgery    MASTOID SURGERY Right     2 x mastoidectomy: 1972 & 1989    TYMPANOPLASTY Right 1980     Social History     Tobacco History     Smoking Status  Current Every Day Smoker Smoking Frequency  2 packs/day for 62 years (114 pk yrs) Smoking Tobacco Type  Cigarettes, Cigars    Smokeless Tobacco Use  Never Used    Tobacco Comment  started at age 11 years, had smoked atleast 2 PPD for 62 years, NOW at 0.5PPD          Alcohol History     Alcohol Use Status  Not Currently Comment  has about 2 beers eevry so often, a six pack can last a week, quit liqour around year 2000          Drug Use     Drug Use Status  Yes Types  Heroin, IV, Marijuana (Weed), Methamphetamines (Crystal Meth)          Sexual Activity     Sexually Active  Not Currently Comment  has no kids         CODE STATUS: Full Code  HEALTH CARE PROXY: he states that he has no one  AMBULATES: independently  DOMICILED: lives in a camper trailer, has 3 steps to enter the home     Family History   Problem Relation Age of Onset    Cancer Mother     Cerebral Aneurysm Father     No Known Problems Brother         MVA    No Known Problems Brother         Drowned     Allergies:   No Known Allergies    Home Medications:  Prior to Admission medications    Not on File   Albuterol PRN       OBJECTIVE:    Vitals:    07/14/22 0308   BP: (!) 176/85   Pulse: 82   Resp: 22   Temp: 98.3 °F (36.8 °C)   SpO2: 96%   breathing room air    BP (!) 176/85   Pulse 82   Temp 98.3 °F (36.8 °C)   Resp 22   Ht 5' 9\" (1.753 m)   Wt 150 lb (68 kg)   SpO2 96%   BMI 22.15 kg/m²     No intake or output data in the 24 hours ending 07/14/22 0709    Physical Exam  Vitals reviewed. Constitutional:       General: He is not in acute distress. Appearance: Normal appearance. He is normal weight. He is not ill-appearing or toxic-appearing. HENT:      Head: Normocephalic and atraumatic. Nose: No congestion or rhinorrhea. Eyes:      General:         Right eye: No discharge. Left eye: No discharge. Neck:      Comments: Trachea appears midline, supple  Cardiovascular:      Rate and Rhythm: Normal rate and regular rhythm. Heart sounds: No murmur heard. No friction rub. No gallop. Pulmonary:      Effort: Pulmonary effort is normal. No respiratory distress. Breath sounds: No stridor. No wheezing, rhonchi or rales. Chest:      Chest wall: No tenderness. Abdominal:      General: Bowel sounds are normal.      Palpations: Abdomen is soft. Tenderness: There is no abdominal tenderness. There is no guarding or rebound. Musculoskeletal:        Hands:       Right lower leg: No edema. Left lower leg: No edema. Skin:     General: Skin is warm. Comments: diaphoretic   Neurological:      Mental Status: He is alert. Cranial Nerves: No dysarthria. Motor: No tremor or seizure activity. Psychiatric:         Mood and Affect: Mood normal.         Behavior: Behavior normal.         LABORATORY DATA:    CBC:   Recent Labs     07/14/22 0426   WBC 6.5   HGB 12.3*   HCT 38.5*        BMP:   Recent Labs     07/14/22 0426      K 4.4      CO2 24   BUN 18   CREATININE 1.1   CALCIUM 9.2     Hepatic Profile:   Recent Labs     07/14/22 0426   AST 21   ALT 19   BILITOT <0.2   ALKPHOS 101     A1C:   Recent Labs     07/14/22 0426   LABA1C 10.2*       IMAGING:  XR FINGER RIGHT (MIN 2 VIEWS)  Result Date: 7/14/2022  No acute osseous findings.   Fusiform soft tissue swelling of the fourth finger Recommendation: Follow up as clinically indicated. Note: Direct correlation with point tenderness and the X-ray images is recommended and does significantly increase the sensitivity of radiographic evaluation. Electronically Signed by Nuno Orozco DO at 14-Jul-2022 05:37:35 AM                 ASESSMENTS & PLANS:    Patient Active Problem List   Diagnosis    Tenosynovitis of hand   Admit to medical surgical jarquin under hospitlaist team  Consult to Orthopaedics  NPO except meds in preperation for surgery  Rocpehin 2g IV Q24h  PharmacoKinetics to dose Vancomycin by level  CBC with Diff in AM  BMP with Mag reflex in AM  F/U ED Blood Cxx  LR at 100cc/h  Strict Is and Os  Daily weights  Elevate Head of Bed 30-45 degrees  Elevate legs to prevent sliding don in bed  Tyenol PRN Mild pain /fever /head ache  Oxycodone 5mg PRN Moderate pain  Miralax PRN  Tums PRN    Uncontrolled DM Type 2: HbA1c 10.7 PoA  Lantus 5 units daily  Low Dose ISS  POCT scheduled and PRN  Hypoglycemics orders set for pt safety    Supoportive and Prophylactic Txx:  DVT Prophylaxis: Heparin SQ  GI (PUD) Ppx: not indicated at this time  PT consult for evalutation and Txx as indicated: not indicated at this time  Diet NPO Exceptions are: Sips of Water with Meds  naloxone, glucose, dextrose bolus **OR** dextrose bolus, glucagon (rDNA), dextrose, sodium chloride flush, sodium chloride, ondansetron **OR** ondansetron, acetaminophen, oxyCODONE, polyethylene glycol, melatonin, morphine, albuterol      Care time of >48 minutes  Pt seen/examined and admitted to inpatient status. Inpatient status is used for patients with an expected LOS extending past two midnights due to medical therapy and or critical care needs, otherwise patients are placed to OBServation status. Signed:  Electronically signed by Victorine Angelucci, MD on 7/14/22 at 07:33 AM CDT.

## 2022-07-14 NOTE — ED NOTES
Called to see if bed was in room. Was told there was a bed in there, but it wasn't made up.       Karey Whittaker RN  07/14/22 0075

## 2022-07-14 NOTE — PLAN OF CARE
[place], [place], [address]    DEPARTMENT OF HOSPITALIST MEDICINE      PLAN  OF  CARE  NOTE:      Patient was admitted earlier today by our nocturnist.  Please see the history and physical for details. I saw and examined the patient at the bedside today. Patient admitted earlier today with cellulitis of right index ring finger secondary to spider bite 5 days ago. He never saw the spider. Patient started on IV antibiotics and evaluated by orthopedic surgeon who wants to monitor him conservatively for now. Patient has uncontrolled diabetes mellitus with hemoglobin A1c 10.2% which will be optimized. DC n.p.o., started patient on diabetic diet. Keep patient n.p.o. after midnight and monitor closely. Follow-up very closely with orthopedics for further management recommendations. Repeat labs in a.m. Electrolyte replacement as per protocol. Patient will be monitored very closely on the floor. Further recommendations as per the hospital course. Patient  is on DVT prophylaxis  Current medications reviewed  Lab work reviewed  Radiology/Chest x-ray films reviewed  Treatment recommendations from suspecialities reviewed, appreciated and agreed with  Discussed with the nurse and addressed all questions/concerns  Discussed with Patient and/or Family at the bedside in detail . .. they understand and agree with the management plan.         León Grijalva MD  7/14/2022, 5:27 PM

## 2022-07-14 NOTE — ED NOTES
Report given to Shaye Ingram RN. She states there is no bed in 328. She states she will call back when bed is in the room.       Flakita Arizmendi RN  07/14/22 9922

## 2022-07-14 NOTE — PROGRESS NOTES
Wanna Lesch arrived to room # 328. Presented with: possible spider bite  Mental Status: Patient is oriented, alert and coherent. Vitals:    07/14/22 0747   BP: (!) 153/76   Pulse: 65   Resp: 17   Temp: 98.4 °F (36.9 °C)   SpO2: 97%     Patient safety contract and falls prevention contract reviewed with patient Yes. Oriented Patient to room. Call light within reach. Yes.   Needs, issues or concerns expressed at this time: no.      Electronically signed by Kirsty Golden RN on 7/14/2022 at 9:28 AM

## 2022-07-14 NOTE — CONSULTS
Orthopaedic Inpatient Consultation    NAME:  Michel Archibald   : 1960  MRN: [id number]    2022  3:06 AM    Requesting Physician: Dr. Zenaida Vela:  right hand/finger pain and swelling      HISTORY OF PRESENT ILLNESS:   The patient is a 58 y.o. male who began having pain and swelling in the right upper extremity 5 days ago. He believes he was bitten by a spider while cleaning out a garage but never actually saw a spider. Pain and swelling involved the digits, hand and distal half of forearm. Pain is now located in the right hand and especially the ring finger. His symptoms have already improved after starting IV abx. Pain is rated a 5/5, constant, dull, worse with movement, better with rest and medication. There are no associated symptoms. Past Medical History:        Diagnosis Date    COPD (chronic obstructive pulmonary disease) ([address]     Diabetes mellitus type 2, uncontrolled, with complications ([address]     Diabetic neuropathy associated with type 2 diabetes mellitus ([address]     Other disorders of kidney and ureter in diseases classified elsewhere     Pneumonia        Past Surgical History:        Procedure Laterality Date    EXTERNAL EAR SURGERY Right     exploratory right external ear surgery    MASTOID SURGERY Right     2 x mastoidectomy:  &     TONSILLECTOMY      TYMPANOPLASTY Right        Current Medications:   Prior to Admission medications    Not on File       Allergies:  Patient has no known allergies.     Social History:   Social History     Socioeconomic History    Marital status: Single     Spouse name: Not on file    Number of children: 0    Years of education: Not on file    Highest education level: Not on file   Occupational History    Occupation: [employer]   Tobacco Use    Smoking status: Current Every Day Smoker     Packs/day: 2.00     Years: 57.00     Pack years: 114.00     Types: Cigarettes, Cigars    Smokeless tobacco: Never Used    Tobacco comment: started at age 11 years, had smoked atleast 2 PPD for 62 years, NOW at 0.5PPD   Vaping Use    Vaping Use: Some days    Substances: Nicotine, THC, CBD, Flavoring   Substance and Sexual Activity    Alcohol use: Not Currently     Comment: has about 2 beers eevry so often, a six pack can last a week, quit liqour around year 2000    Drug use: Yes     Types: Marijuana (Weed), Methamphetamines (Crystal Meth), Heroin, IV    Sexual activity: Not Currently     Comment: has no kids   Other Topics Concern    Not on file   Social History Narrative    CODE STATUS: Full Code    HEALTH CARE PROXY: he states that he has no one    AMBULATES: independently    DOMICILED: lives in a camper trailer, has 3 steps to enter the home     Social Determinants of Health     Financial Resource Strain:     Difficulty of Paying Living Expenses: Not on file   Food Insecurity:     Worried About [address] in the Last Year: Not on file    Rod of Food in the Last Year: Not on file   Transportation Needs:     Lack of Transportation (Medical): Not on file    Lack of Transportation (Non-Medical):  Not on file   Physical Activity:     Days of Exercise per Week: Not on file    Minutes of Exercise per Session: Not on file   Stress:     Feeling of Stress : Not on file   Social Connections:     Frequency of Communication with Friends and Family: Not on file    Frequency of Social Gatherings with Friends and Family: Not on file    Attends Christian Services: Not on file    Active Member of Clubs or Organizations: Not on file    Attends Club or Organization Meetings: Not on file    Marital Status: Not on file   Intimate Partner Violence:     Fear of Current or Ex-Partner: Not on file    Emotionally Abused: Not on file    Physically Abused: Not on file    Sexually Abused: Not on file   Housing Stability:     Unable to Pay for Housing in the Last Year: Not on file    Number of [place] in the Last Year: Not on file    Unstable Housing in the Last Year: Not on file       Family History:   Family History   Problem Relation Age of Onset    Cancer Mother     Cerebral Aneurysm Father     No Known Problems Brother         MVA    No Known Problems Brother         Drowned       REVIEW OF SYSTEMS:  14 point review of systems has been reviewed from the patient's emergency room visit, reviewed with the patient on today's date with no new changes. PHYSICAL EXAM:      Physical Examination:  Vitals:   Vitals:    07/14/22 0308 07/14/22 0747 07/14/22 0915   BP: (!) 176/85 (!) 153/76 134/71   Pulse: 82 65 62   Resp: 22 17 16   Temp: 98.3 °F (36.8 °C) 98.4 °F (36.9 °C) 97.9 °F (36.6 °C)   TempSrc:  Oral    SpO2: 96% 97% 98%   Weight: 150 lb (68 kg)  159 lb 8 oz (72.3 kg)   Height: 5' 9\" (1.753 m)       General:  Appears stated age, no distress. Orientation:  Alert and oriented to time, place, and person. Mood and Affect:  Cooperative and pleasant. Gait:  Resting comfortably in bed. Cardiovascular:  Symmetric 1-2 plus pulses in upper and lower extremities. Lymph:  No cervical or inguinal lymphadenopathy noted. Sensation:  Grossly intact to light touch. DTR:  Normal, no pathologic reflexes. Coordination/balance:  Normal    Musculoskeletal:  Right upper extremity exam:  There is no tenderness to palpation about the shoulder, elbow, wrist or hand. Unrestricted full motion is present. Stability is normal with provocative tests, 5/5 strength, and skin is normal.      Left upper extremity exam:  There is no tenderness to palpation about the shoulder, elbow, wrist or hand. Unrestricted full motion is present. Stability is normal with provocative tests, 5/5 strength, and skin is normal.     Right lower extremity exam:  Skin is intact. Swelling and likely abscess is noted at proximal ring finger. Swelling extends to dorsum of the hand. There is palpable warmth. No drainage.       Left lower extremity exam:  There is no tenderness to palpation about the hip, knee, ankle or foot. Unrestricted full motion is present. Stability is normal with provocative tests, 5/5 strength, and skin is normal.      DATA:    CBC with Differential:    Lab Results   Component Value Date/Time    WBC 6.5 07/14/2022 04:26 AM    RBC 4.34 07/14/2022 04:26 AM    HGB 12.3 07/14/2022 04:26 AM    HCT 38.5 07/14/2022 04:26 AM     07/14/2022 04:26 AM    MCV 88.7 07/14/2022 04:26 AM    MCH 28.3 07/14/2022 04:26 AM    MCHC 31.9 07/14/2022 04:26 AM    RDW 13.2 07/14/2022 04:26 AM    LYMPHOPCT 28.7 07/14/2022 04:26 AM    MONOPCT 5.9 07/14/2022 04:26 AM    BASOPCT 0.3 07/14/2022 04:26 AM    MONOSABS 0.40 07/14/2022 04:26 AM    LYMPHSABS 1.9 07/14/2022 04:26 AM    EOSABS 0.10 07/14/2022 04:26 AM    BASOSABS 0.00 07/14/2022 04:26 AM     CMP:    Lab Results   Component Value Date/Time     07/14/2022 04:26 AM    K 4.4 07/14/2022 04:26 AM    K 4.6 02/22/2022 06:57 PM     07/14/2022 04:26 AM    CO2 24 07/14/2022 04:26 AM    BUN 18 07/14/2022 04:26 AM    CREATININE 1.1 07/14/2022 04:26 AM    GFRAA >59 07/14/2022 04:26 AM    LABGLOM >60 07/14/2022 04:26 AM    GLUCOSE 147 07/14/2022 04:26 AM    PROT 7.4 07/14/2022 04:26 AM    CALCIUM 9.2 07/14/2022 04:26 AM    BILITOT <0.2 07/14/2022 04:26 AM    ALKPHOS 101 07/14/2022 04:26 AM    AST 21 07/14/2022 04:26 AM    ALT 19 07/14/2022 04:26 AM     BMP:    Lab Results   Component Value Date/Time     07/14/2022 04:26 AM    K 4.4 07/14/2022 04:26 AM    K 4.6 02/22/2022 06:57 PM     07/14/2022 04:26 AM    CO2 24 07/14/2022 04:26 AM    BUN 18 07/14/2022 04:26 AM    CREATININE 1.1 07/14/2022 04:26 AM    CALCIUM 9.2 07/14/2022 04:26 AM    GFRAA >59 07/14/2022 04:26 AM    LABGLOM >60 07/14/2022 04:26 AM    GLUCOSE 147 07/14/2022 04:26 AM         Radiology: I have reviewed the radiology images listed below and agree with the findings of the interpreting radiologist(s).      XR FINGER RIGHT (MIN 2 VIEWS)    Result Date: 7/14/2022  NO PRIOR REPORT AVAILABLE Exam: X-RAYS OF THE RIGHT FOURTH FINGER Clinical data: Swelling pain. Possible spider bite, swelling to 4th digit. Technique: Three views of the right fourth finger. Prior studies: No prior studies submitted. Findings: No acute fracture or dislocation. Fusiform soft tissue swelling of the fourth digit. Degenerative changes    No acute osseous findings. Fusiform soft tissue swelling of the fourth finger Recommendation: Follow up as clinically indicated. Note: Direct correlation with point tenderness and the X-ray images is recommended and does significantly increase the sensitivity of radiographic evaluation. Electronically Signed by Camille Izquierdo DO at 14-Jul-2022 05:37:35 AM             --------------------------------------------------------------------------------------------------------------------    Assessment: Cellulitis/abscess, right hand    Plan:  1) The plan for now will be conservative. Continue IV abx.  2) Warm compresses to ring finger. 3) Comfort meds. 4) Will follow daily. He may require I&D if he does not progress well with conservative treatment. Will make recommendations accordingly. Thank you for the consult.        Electronically signed by Temi Robles PA-C on 7/14/2022 at 11:48 AM

## 2022-07-15 VITALS
SYSTOLIC BLOOD PRESSURE: 118 MMHG | HEIGHT: 69 IN | WEIGHT: 159.5 LBS | DIASTOLIC BLOOD PRESSURE: 79 MMHG | RESPIRATION RATE: 16 BRPM | TEMPERATURE: 97.6 F | OXYGEN SATURATION: 97 % | BODY MASS INDEX: 23.62 KG/M2 | HEART RATE: 69 BPM

## 2022-07-15 PROBLEM — J18.9 PNEUMONIA: Status: ACTIVE | Noted: 2022-07-15

## 2022-07-15 PROBLEM — Z71.6 TOBACCO ABUSE COUNSELING: Status: ACTIVE | Noted: 2022-07-15

## 2022-07-15 PROBLEM — N29 OTHER DISORDERS OF KIDNEY AND URETER IN DISEASES CLASSIFIED ELSEWHERE: Status: ACTIVE | Noted: 2022-07-15

## 2022-07-15 PROBLEM — J44.9 COPD (CHRONIC OBSTRUCTIVE PULMONARY DISEASE) (HCC): Status: ACTIVE | Noted: 2022-07-15

## 2022-07-15 PROBLEM — Z91.199 PERSONAL HISTORY OF NONCOMPLIANCE WITH MEDICAL TREATMENT AND REGIMEN: Status: ACTIVE | Noted: 2022-07-15

## 2022-07-15 PROBLEM — F17.210 DEPENDENCE ON NICOTINE FROM CIGARETTES: Status: ACTIVE | Noted: 2022-07-15

## 2022-07-15 PROBLEM — E11.40 DIABETIC NEUROPATHY ASSOCIATED WITH TYPE 2 DIABETES MELLITUS (HCC): Status: ACTIVE | Noted: 2022-07-15

## 2022-07-15 LAB
ANION GAP SERPL CALCULATED.3IONS-SCNC: 5 MMOL/L (ref 7–19)
BASOPHILS ABSOLUTE: 0 K/UL (ref 0–0.2)
BASOPHILS RELATIVE PERCENT: 0.2 % (ref 0–1)
BUN BLDV-MCNC: 17 MG/DL (ref 8–23)
CALCIUM SERPL-MCNC: 8.5 MG/DL (ref 8.8–10.2)
CHLORIDE BLD-SCNC: 106 MMOL/L (ref 98–111)
CO2: 26 MMOL/L (ref 22–29)
CREAT SERPL-MCNC: 1.1 MG/DL (ref 0.5–1.2)
EOSINOPHILS ABSOLUTE: 0.1 K/UL (ref 0–0.6)
EOSINOPHILS RELATIVE PERCENT: 1.9 % (ref 0–5)
GFR AFRICAN AMERICAN: >59
GFR NON-AFRICAN AMERICAN: >60
GLUCOSE BLD-MCNC: 134 MG/DL (ref 70–99)
GLUCOSE BLD-MCNC: 205 MG/DL (ref 70–99)
GLUCOSE BLD-MCNC: 233 MG/DL (ref 74–109)
HCT VFR BLD CALC: 32.6 % (ref 42–52)
HEMOGLOBIN: 10.8 G/DL (ref 14–18)
IMMATURE GRANULOCYTES #: 0 K/UL
LYMPHOCYTES ABSOLUTE: 1.4 K/UL (ref 1.1–4.5)
LYMPHOCYTES RELATIVE PERCENT: 33.4 % (ref 20–40)
MAGNESIUM: 1.6 MG/DL (ref 1.6–2.4)
MCH RBC QN AUTO: 28.8 PG (ref 27–31)
MCHC RBC AUTO-ENTMCNC: 33.1 G/DL (ref 33–37)
MCV RBC AUTO: 86.9 FL (ref 80–94)
MONOCYTES ABSOLUTE: 0.3 K/UL (ref 0–0.9)
MONOCYTES RELATIVE PERCENT: 8 % (ref 0–10)
NEUTROPHILS ABSOLUTE: 2.4 K/UL (ref 1.5–7.5)
NEUTROPHILS RELATIVE PERCENT: 56.3 % (ref 50–65)
PDW BLD-RTO: 13.1 % (ref 11.5–14.5)
PERFORMED ON: ABNORMAL
PERFORMED ON: ABNORMAL
PHOSPHORUS: 3.8 MG/DL (ref 2.5–4.5)
PLATELET # BLD: 119 K/UL (ref 130–400)
PMV BLD AUTO: 10.4 FL (ref 9.4–12.4)
POTASSIUM REFLEX MAGNESIUM: 4.5 MMOL/L (ref 3.5–5)
RBC # BLD: 3.75 M/UL (ref 4.7–6.1)
SODIUM BLD-SCNC: 137 MMOL/L (ref 136–145)
VITAMIN D 25-HYDROXY: 22.3 NG/ML
WBC # BLD: 4.3 K/UL (ref 4.8–10.8)

## 2022-07-15 PROCEDURE — 82947 ASSAY GLUCOSE BLOOD QUANT: CPT

## 2022-07-15 PROCEDURE — G0378 HOSPITAL OBSERVATION PER HR: HCPCS

## 2022-07-15 PROCEDURE — 85025 COMPLETE CBC W/AUTO DIFF WBC: CPT

## 2022-07-15 PROCEDURE — 6360000002 HC RX W HCPCS: Performed by: HOSPITALIST

## 2022-07-15 PROCEDURE — 84100 ASSAY OF PHOSPHORUS: CPT

## 2022-07-15 PROCEDURE — 6370000000 HC RX 637 (ALT 250 FOR IP): Performed by: HOSPITALIST

## 2022-07-15 PROCEDURE — 2580000003 HC RX 258: Performed by: HOSPITALIST

## 2022-07-15 PROCEDURE — 96366 THER/PROPH/DIAG IV INF ADDON: CPT

## 2022-07-15 PROCEDURE — 96375 TX/PRO/DX INJ NEW DRUG ADDON: CPT

## 2022-07-15 PROCEDURE — 36415 COLL VENOUS BLD VENIPUNCTURE: CPT

## 2022-07-15 PROCEDURE — 82306 VITAMIN D 25 HYDROXY: CPT

## 2022-07-15 PROCEDURE — 80048 BASIC METABOLIC PNL TOTAL CA: CPT

## 2022-07-15 PROCEDURE — 96372 THER/PROPH/DIAG INJ SC/IM: CPT

## 2022-07-15 PROCEDURE — 83735 ASSAY OF MAGNESIUM: CPT

## 2022-07-15 RX ORDER — INSULIN GLARGINE 100 [IU]/ML
5 INJECTION, SOLUTION SUBCUTANEOUS NIGHTLY
Status: DISCONTINUED | OUTPATIENT
Start: 2022-07-15 | End: 2022-07-15 | Stop reason: HOSPADM

## 2022-07-15 RX ORDER — GLIPIZIDE 5 MG/1
5 TABLET ORAL
Status: DISCONTINUED | OUTPATIENT
Start: 2022-07-16 | End: 2022-07-15 | Stop reason: HOSPADM

## 2022-07-15 RX ADMIN — MORPHINE SULFATE 2 MG: 2 INJECTION, SOLUTION INTRAMUSCULAR; INTRAVENOUS at 10:36

## 2022-07-15 RX ADMIN — OXYCODONE 5 MG: 5 TABLET ORAL at 13:26

## 2022-07-15 RX ADMIN — SODIUM CHLORIDE: 9 INJECTION, SOLUTION INTRAVENOUS at 05:15

## 2022-07-15 RX ADMIN — OXYCODONE 5 MG: 5 TABLET ORAL at 03:08

## 2022-07-15 RX ADMIN — VANCOMYCIN HYDROCHLORIDE 1250 MG: 10 INJECTION, POWDER, LYOPHILIZED, FOR SOLUTION INTRAVENOUS at 05:17

## 2022-07-15 RX ADMIN — INSULIN LISPRO 2 UNITS: 100 INJECTION, SOLUTION INTRAVENOUS; SUBCUTANEOUS at 08:00

## 2022-07-15 RX ADMIN — CEFTRIAXONE 2000 MG: 2 INJECTION, POWDER, FOR SOLUTION INTRAMUSCULAR; INTRAVENOUS at 07:59

## 2022-07-15 RX ADMIN — SODIUM CHLORIDE, PRESERVATIVE FREE 10 ML: 5 INJECTION INTRAVENOUS at 08:00

## 2022-07-15 RX ADMIN — MORPHINE SULFATE 2 MG: 2 INJECTION, SOLUTION INTRAMUSCULAR; INTRAVENOUS at 15:20

## 2022-07-15 RX ADMIN — HEPARIN SODIUM 5000 UNITS: 5000 INJECTION INTRAVENOUS; SUBCUTANEOUS at 05:18

## 2022-07-15 RX ADMIN — HEPARIN SODIUM 5000 UNITS: 5000 INJECTION INTRAVENOUS; SUBCUTANEOUS at 13:27

## 2022-07-15 ASSESSMENT — PAIN DESCRIPTION - LOCATION
LOCATION: FINGER (COMMENT WHICH ONE)
LOCATION: FINGER (COMMENT WHICH ONE)
LOCATION: HAND;FINGER (COMMENT WHICH ONE)
LOCATION: FINGER (COMMENT WHICH ONE)

## 2022-07-15 ASSESSMENT — PAIN SCALES - GENERAL
PAINLEVEL_OUTOF10: 7
PAINLEVEL_OUTOF10: 8
PAINLEVEL_OUTOF10: 8
PAINLEVEL_OUTOF10: 0
PAINLEVEL_OUTOF10: 6

## 2022-07-15 ASSESSMENT — PAIN DESCRIPTION - DESCRIPTORS
DESCRIPTORS: THROBBING;STABBING
DESCRIPTORS: THROBBING
DESCRIPTORS: THROBBING
DESCRIPTORS: PRESSURE;TENDER;TIGHTNESS

## 2022-07-15 ASSESSMENT — PAIN DESCRIPTION - ORIENTATION
ORIENTATION: RIGHT

## 2022-07-15 NOTE — PROGRESS NOTES
Pt. Left AMA, he never gave a reason for leaving other than \"he just wanted to\". Asked for antibiotics and it was explained that he could not receive any if he was leaving AMA. He still wished to leave and proceeded to do so, papers signed.     Electronically signed by Joyce Ferrer RN on 7/15/2022 at 5:01 PM

## 2022-07-15 NOTE — CARE COORDINATION
presence of head-of-bed restrictions (less than 30 degrees)?: No  Is there presence of tracheotomy tube?: No  Is the patient ordered nothing-by-mouth status?: No  3 oz Water Swallow Screen: Pass    Patient Deficit Notes: Additional CM/SW Notes:  pt lives alone in a camper behind a friends house. Plans to return to [facility] upon dc. Has electric and water. Does not have a drivers lisc. 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 Spoke with him about getting pcp or going to [facility] clinic. Pt said she has looked into but just has never gone. Able to get meds as needed. has oritable 02 at home when he feels like he needs it. Does not know what compnay he uses. Does not have 02 on at time of assessment. Will cont to follow for any needs. Electronically signed by Zoey Gonzalez RN on 7/15/2022 at 2:57 PM          Seb[place] and/or his family were provided with choice of provider:    []   Yes   []   No        []   Stroke education booklet reviewed and given to patient/family/caregiver/guardian. All questions answered all questions answered appropriately and efficiently per family.       Zoey Gonzalez RN  [facility]  Care Management  Phone:   [phone]          Fax:

## 2022-07-15 NOTE — PLAN OF CARE
Problem: Discharge Planning  Goal: Discharge to home or other facility with appropriate resources  Outcome: Progressing  Flowsheets (Taken 7/14/2022 1050 by Zuly Negron RN)  Discharge to home or other facility with appropriate resources:   Identify discharge learning needs (meds, wound care, etc)   Arrange for needed discharge resources and transportation as appropriate   Refer to discharge planning if patient needs post-hospital services based on physician order or complex needs related to functional status, cognitive ability or social support system     Problem: Pain  Goal: Verbalizes/displays adequate comfort level or baseline comfort level  Outcome: Progressing     Problem: Safety - Adult  Goal: Free from fall injury  Outcome: Progressing     Problem: ABCDS Injury Assessment  Goal: Absence of physical injury  Outcome: Progressing

## 2022-07-15 NOTE — DISCHARGE SUMMARY
Renay Kim lcuy, [address] [place], [place] [address]IST MEDICINE      LEFT  AGAINST  MEDICAL  ADVICE:      Deannanaomi Moran COURSE:    07/15/2022:  Patient still has a swelling over his right ring finger. Orthopedics have ordered warm/hot compresses. Continue with IV antibiotics. Orthopedics will consider I&D if conservative measures fail. Patient started on vitamin D 50,000 units q weekly for depleted vitamin D levels. Patient has uncontrolled diabetes mellitus with hemoglobin A1c 10.2%. He has been started on oral diabetic meds and sliding scale insulin coverage. 07/14/2022:  Patient was admitted earlier today by our nocturnist.  Please see the history and physical for details. I saw and examined the patient at the bedside today. Patient admitted earlier today with cellulitis of right index ring finger secondary to spider bite 5 days ago. He never saw the spider. Patient started on IV antibiotics and evaluated by orthopedic surgeon who wants to monitor him conservatively for now. Patient has uncontrolled diabetes mellitus with hemoglobin A1c 10.2% which will be optimized. DC n.p.o., started patient on diabetic diet. Keep patient n.p.o. after midnight and monitor closely. Follow-up very closely with orthopedics for further management recommendations. 07/14/2022:  HPI:  Mr. Chanell Castellano is a pleasant 58year old  [place] gentleman who had a spider bite 4-5 nights ago when helping to clean the garage late at night. He states that he came in this AM as he could not take the pain anymore. He has been using the triple antibiotic ointment without help. He states that he had the pain burning and throbbing in the hand and that it radiated up to the elbow. He states that this is the most painful thing he has ever had in his life. He states that he has had a yellowish tinted clearish drainage without any evident cream in it. He states that he can feel fluid in it.  He states that the forearm was tightly swollen and very tender earlier but it has shrunk back toward the finger in the right hand where it began. Please note patient left against medical advice with own free will and was NOT formally admitted or discharged from the Hospitalist service.           Earlene Lezama MD  7/15/2022, 5:07 PM

## 2022-07-15 NOTE — PROGRESS NOTES
[place], [place], [address]    DEPARTMENT OF HOSPITALIST MEDICINE      PROGRESS  NOTE:    NOTE: Portions of this note have been copied forward, however, changed to reflect the most current clinical status of this patient. Patient:  Vianney Welch  YOB: 1960  Date of Service: 7/15/2022  MRN: [id number]   Acct: [de-identified]   Primary Care Physician: None Provider  Advance Directive: Full Code  Admit Date: 7/14/2022       Hospital Day: 1      CHIEF COMPLAINT:  Chief Complaint   Patient presents with    Insect Bite     pt states he thinks he got bit by a spider last Saturday, right hand ring finer       SUBJECTIVE:  Patient just got out of shower during my morning round. He still has swelling over his right ring finger but pain is improving. [address]  COURSE:    07/15/2022:  Patient still has a swelling over his right ring finger. Orthopedics have ordered warm/hot compresses. Continue with IV antibiotics. Orthopedics will consider I&D if conservative measures fail. Patient started on vitamin D 50,000 units q weekly for depleted vitamin D levels. Patient has uncontrolled diabetes mellitus with hemoglobin A1c 10.2%. He has been started on oral diabetic meds and sliding scale insulin coverage. 07/14/2022:  Patient was admitted earlier today by our nocturnist.  Please see the history and physical for details. I saw and examined the patient at the bedside today. Patient admitted earlier today with cellulitis of right index ring finger secondary to spider bite 5 days ago. He never saw the spider. Patient started on IV antibiotics and evaluated by orthopedic surgeon who wants to monitor him conservatively for now. Patient has uncontrolled diabetes mellitus with hemoglobin A1c 10.2% which will be optimized. DC n.p.o., started patient on diabetic diet. Keep patient n.p.o. after midnight and monitor closely.   Follow-up very closely with orthopedics for further Right 1980        FAMILY HISTORY:  Family History   Problem Relation Age of Onset    Cancer Mother     Cerebral Aneurysm Father     No Known Problems Brother         MVA    No Known Problems Brother         Drowned           OBJECTIVE:  /79   Pulse 69   Temp 97.6 °F (36.4 °C)   Resp 16   Ht 5' 9\" (1.753 m)   Wt 159 lb 8 oz (72.3 kg)   SpO2 97%   BMI 23.55 kg/m²   I/O this shift:  In: 1390 [P.O.:1390]  Out: 400 [Urine:400]    PHYSICAL  EXAMINATION:    MARGIE:  Awake, alert, oriented x 3, patient appears tired and fatigued   Head/Eyes:  Normocephalic, atraumatic, EOMI and PERRLA bilaterally   Respiratory:   Bilateral fair air entry in both lung fields, mild B/L crackles, symmetric expansion of chest   Cardiovascular:  Regular rate and rhythm, S1+S2+0, no murmurs/rubs   Abdomen:   Soft, non-tender, bowel sounds +ve, no organomegaly   Extremities: Moves all, full range of motion, ++ swelling/erythema right ring finger   Neurologic: Awake, alert, oriented x 3, cranial nerves II-XII intact, no focal neurological deficits, sensory system intact   Psychiatric: Normal mood, non-suicidal       CURRENT MEDICATIONS:  Scheduled:   cefTRIAXone (ROCEPHIN) IV  2,000 mg IntraVENous Q24H    [START ON 7/16/2022] metFORMIN  500 mg Oral BID WC    [START ON 7/16/2022] glipiZIDE  5 mg Oral BID AC    insulin glargine  5 Units SubCUTAneous Nightly    insulin lispro  0-6 Units SubCUTAneous TID WC    insulin lispro  0-3 Units SubCUTAneous Nightly    sodium chloride flush  5-40 mL IntraVENous 2 times per day    heparin (porcine)  5,000 Units SubCUTAneous 3 times per day    vancomycin (VANCOCIN) intermittent dosing (placeholder)   Other RX Placeholder    vancomycin  1,250 mg IntraVENous Q24H        PRN:  naloxone, 0.4 mg, PRN  glucose, 4 tablet, PRN  dextrose bolus, 125 mL, PRN   Or  dextrose bolus, 250 mL, PRN  glucagon (rDNA), 1 mg, PRN  dextrose, 100 mL/hr, PRN  sodium chloride flush, 5-40 mL, PRN  sodium chloride, , PRN  ondansetron, 4 mg, Q8H PRN   Or  ondansetron, 4 mg, Q6H PRN  acetaminophen, 650 mg, Q4H PRN  oxyCODONE, 5 mg, Q4H PRN  polyethylene glycol, 17 g, Daily PRN  melatonin, 5 mg, Nightly PRN  morphine, 2 mg, Q2H PRN  albuterol, 2.5 mg, Q1H PRN      Infusions:   dextrose      sodium chloride 100 mL/hr at 07/15/22 0515    lactated ringers 100 mL/hr at 07/14/22 1003       Laboratory Data:  Recent Labs     07/14/22  0426 07/15/22  0237   WBC 6.5 4.3*   HGB 12.3* 10.8*    119*     Recent Labs     07/14/22  0426 07/15/22  0237    137   K 4.4 4.5    106   CO2 24 26   BUN 18 17   CREATININE 1.1 1.1   GLUCOSE 147* 233*     Recent Labs     07/14/22 0426   AST 21   ALT 19   BILITOT <0.2   ALKPHOS 101     Troponin T: No results for input(s): TROPONINI in the last 72 hours. Pro-BNP: No results for input(s): BNP in the last 72 hours. INR: No results for input(s): INR in the last 72 hours. UA:No results for input(s): NITRITE, COLORU, PHUR, LABCAST, WBCUA, RBCUA, MUCUS, TRICHOMONAS, YEAST, BACTERIA, CLARITYU, SPECGRAV, LEUKOCYTESUR, UROBILINOGEN, BILIRUBINUR, BLOODU, GLUCOSEU, AMORPHOUS in the last 72 hours. Invalid input(s): Christine Robledo  A1C:   Recent Labs     07/14/22 0426   LABA1C 10.2*     ABG:No results for input(s): PHART, JRI8ELE, PO2ART, FCE0WGF, BEART, HGBAE, C0TZKIYJ, CARBOXHGBART in the last 72 hours. Imaging:    XR FINGER RIGHT (MIN 2 VIEWS)    Result Date: 7/14/2022  No acute osseous findings. Fusiform soft tissue swelling of the fourth finger Recommendation: Follow up as clinically indicated. Note: Direct correlation with point tenderness and the X-ray images is recommended and does significantly increase the sensitivity of radiographic evaluation.  Electronically Signed by Ana Maria Montoya DO at 14-Jul-2022 05:37:35 AM                ASSESSMENT & PLAN:    Principal Problem:    Tenosynovitis of hand  Active Problems:    COPD (chronic obstructive pulmonary disease) (HCC)    Diabetes mellitus type 2, uncontrolled, with complications ([facility] [address]    Diabetic neuropathy associated with type 2 diabetes mellitus ([facility] [address]    Dependence on nicotine from cigarettes    Tobacco abuse counseling    Personal history of noncompliance with medical treatment and regimen  Resolved Problems:    * No resolved hospital problems. *      Continue with current medications  Continue with IV antibiotics in the form of IV Rocephin and vancomycin . .. Day #2  Orthopedics evaluation and recommendation since reviewed, appreciated and agreed with  Continue with hot/warm compresses and conservative measures  Orthopedic we will consider taking patient OR for I&D if conservative measures fail  Patient is an current diabetes mellitus with hemoglobin A1c 10.2% as he is noncompliant with his diabetic medications and management  He is currently on no diabetic meds as per med rec sheet upon admission  Patient started on Lantus insulin 5 units nightly on admission which will be continued until tonight  Patient will be started on metformin 500 mg p.o. twice daily and glipizide 5 mg p.o. twice daily from tomorrow  Accu-Cheks qAC and qHS ordered with low/medium/high-dose insulin coverage as per protocol, based on patient's blood sugar levels  Patient continued on diabetic diet  Patient started on vitamin D 50,000 units q weekly for depleted vitamin D levels      Chronic medical issues . .. Continue with home meds. Monitor patient closely while admitted. Advised very close f/u with patient's PCP as an outpatient to address chronic medical issues. Dependence on nicotine from cigarettes          Tobacco abuse counseling  Patient smokes cigarettes on a chronic basis. Strictly advised patient to cut down on or quit smoking. Nicotine patch offered. ~5 minutes spent on tobacco cessation counseling with the patient. Websites - http://smokefree. gov & LegalWarrants.[employer]. com   °Quitlines - 1-800-QUIT-NOW (5-820-828-048-260-8864)   °[employer] - QuitSTART [place] °Text Messages - SmokefreeTXT (send the word QUIT to 92824)       Personal history of noncompliance with medical treatment and regimen  STRICTLY advised patient to be compliant with meds and medical recommendations  Advised patient to get established with a PCP upon discharge, take care of meds, diet and bring patient's self and life back on track        Repeat labs in a.m. Electrolyte replacement as per protocol. Patient will be monitored very closely on the floor. Further recommendations as per the hospital course. Discharge Plan: To be determined as per the hospital course      Patient  is on DVT prophylaxis  Current medications reviewed  Lab work reviewed  Radiology/Chest x-ray films reviewed  Treatment recommendations from suspecialities reviewed, appreciated and agreed with  Discussed with the nurse and addressed all questions/concerns  Discussed with Patient and/or Family at the bedside in detail . .. they understand and agree with the management plan. León Grijalva MD  7/15/2022 2:18 PM      DISCLAIMER: This note was created with electronic voice recognition which does have occasional errors. If you have any questions regarding the content within the note please do not hesitate to contact me. .. Thanks.

## 2022-07-15 NOTE — PROGRESS NOTES
Orthopedic Surgery Progress Note    Margarita Best  7/15/2022      Subjective:     Systemic or Specific Complaints:  c/o pain and swelling equivalent to yesterday. Objective:     Patient Vitals for the past 24 hrs:   BP Temp Temp src Pulse Resp SpO2 Weight   07/15/22 0651 115/80 97.3 °F (36.3 °C) -- 63 20 98 % --   07/15/22 0012 134/65 98.3 °F (36.8 °C) Temporal 69 16 96 % --   07/14/22 1830 110/65 97 °F (36.1 °C) -- 64 16 100 % --   07/14/22 1300 133/69 96.9 °F (36.1 °C) -- 70 16 99 % --   07/14/22 0915 134/71 97.9 °F (36.6 °C) -- 62 16 98 % 159 lb 8 oz (72.3 kg)   07/14/22 0747 (!) 153/76 98.4 °F (36.9 °C) Oral 65 17 97 % --       right upper  General: alert, appears stated age and cooperative   Wound: clean, dry, intact             Dressing: clean, dry, and intact   Extremity: Distal NVI. Swelling and redness having improved little           DVT Exam: No evidence of DVT seen on physical exam.                   Data Review:  Recent Labs     07/14/22  0426 07/15/22  0237   HGB 12.3* 10.8*     Recent Labs     07/15/22  0237      K 4.5   CREATININE 1.1       Assessment:     Cellulitis/abscess, right hand    Plan:     Continue IV abx. Warm/hot compresses have yet to be started. Discussed with his RN. Cont comfort meds.         Electronically signed by Danii Acosta PA-C on 7/15/2022 at 7:18 AM

## 2022-07-19 LAB
BLOOD CULTURE, ROUTINE: NORMAL
CULTURE, BLOOD 2: NORMAL